# Patient Record
Sex: FEMALE | Race: AMERICAN INDIAN OR ALASKA NATIVE
[De-identification: names, ages, dates, MRNs, and addresses within clinical notes are randomized per-mention and may not be internally consistent; named-entity substitution may affect disease eponyms.]

---

## 2017-09-18 NOTE — C.PDOC
History Of Present Illness





27 y/o female with hx asthma c/o cough with chest tightness for last 2 1/2 days 

with chills. pt ran out of inhaler. no sick contacts. pt has pleuritic pain 

with cough, no sob. no fever. pt not breastfeeding.  


Time Seen by Provider: 17 14:12


Chief Complaint (Nursing): Cough, Cold, Congestion


History Per: Patient


History/Exam Limitations: no limitations


Onset/Duration Of Symptoms: Days (2 1/2)


Current Symptoms Are (Timing): Still Present


Associated Symptoms: Chills, Cough


Ear Symptoms: Bilateral: None





Past Medical History


Reviewed: Historical Data, Nursing Documentation, Vital Signs


Vital Signs: 


 Last Vital Signs











Temp  97.4 F L  17 16:42


 


Pulse  84   17 16:42


 


Resp  18   17 16:42


 


BP  117/78   17 16:42


 


Pulse Ox  96   17 22:55














- Medical History


PMH: Asthma


Surgical History: 


Family History: States: Unknown Family Hx





- Social History


Hx Tobacco Use: No


Hx Alcohol Use: No


Hx Substance Use: No





- Immunization History


Hx Tetanus Toxoid Vaccination: No


Hx Influenza Vaccination: No


Hx Pneumococcal Vaccination: No





Review Of Systems


Constitutional: Positive for: Chills.  Negative for: Fever


ENT: Negative for: Ear Pain, Throat Pain


Cardiovascular: Positive for: Chest Pain (sasthma like tightness)


Respiratory: Positive for: Cough, Pleuritic Pain, Sputum


Gastrointestinal: Negative for: Vomiting, Abdominal Pain, Diarrhea


Musculoskeletal: Negative for: Back Pain


Neurological: Negative for: Weakness, Numbness





Physical Exam





- Physical Exam


Appears: Non-toxic, Other (uncomfortable)


Skin: Warm, Dry


Head: Atraumatic, Normacephalic


Oral Mucosa: Moist


Tongue: Normal Appearing


Lips: Normal Appearing


Throat: No Erythema, No Exudate


Neck: Normal ROM, Supple


Chest: No Deformity, No Tenderness


Cardiovascular: Rhythm Regular (tachycardic, low 100s)


Respiratory: Decreased Breath Sounds (with insp wheezing in lower fields. ), No 

Rales, No Rhonchi, Wheezing, Other (tachypneic)


Back: No CVA Tenderness


Neurological/Psych: Oriented x3, Normal Speech, Normal Cognition





ED Course And Treatment


ECG: Interpreted By Me, Viewed By Me


ECG Rhythm: Sinus Tachycardia


Interpretation Of ECG: nsr, non specific st-t changes


Rate From EC


O2 Sat by Pulse Oximetry: 96


Pulse Ox Interpretation: Normal





Medical Decision Making


Medical Decision Making: 





pt with cough and chest tightness; ekg, cxr, duoneb and tylenol ordered. will re

-eval. 





327 pm  pt reports dec chest tightness after tylenol and nebulizer.  increased 

bs in upper fields, still coarse sounds in basilar fields. will give another 

neb tx. 





444 pm  pt feeling much better, lungs cta, not tachypneic. will d/c with 

albuterol mdi. tylenol and zithromax. f/u med clinic in 1-2 days. 





Disposition


Counseled Patient/Family Regarding: Studies Performed, Diagnosis, Need For 

Followup, Rx Given





- Disposition


Referrals: 


Clinic,Med Surg [Primary Care Provider] - 


Disposition: HOME/ ROUTINE


Disposition Time: 16:45


Condition: IMPROVED


Additional Instructions: 


Take antibiotic once a day for next 4 days. Use inhaler every 4-6 hours, 2 

puffs at a time. Tylenol for pain.  Follow up wiht PMD in 1-2 days.  Return to 

ED for any worsening symptoms. 


Prescriptions: 


Albuterol HFA [Ventolin HFA 90 mcg/actuation (8 g)] 2 puff IH [id number] #1 inh


Acetaminophen [Tylenol Extra Strength] 1,000 mg PO TID #40 tablet


Azithromycin [Zithromax] 250 mg PO DAILY #4 tab


Instructions:  Community Acquired Pneumonia (ED)


Forms:  [facility] (English), General Discharge Instructions





- Clinical Impression


Clinical Impression: 


 Pneumonia, Asthma

## 2017-09-18 NOTE — RAD
Chest x-ray two views 



History: Cough. 



Comparison: None available. 



Findings: 



Diffuse increased interstitial lung markings which may represent mild 

venous congestion and or mild interstitial infiltrate. 



No focal pleural effusion. 



Minimal atelectatic changes in the right midlung zone. 



Bibasilar breast and nipple shadows. 



Heart size within normal limits. 



Impression: 



Diffuse increased interstitial lung markings which may represent mild 

venous congestion and or mild interstitial infiltrate. 



No focal pleural effusion. 



Minimal atelectatic changes in the right midlung zone.

## 2017-09-19 NOTE — CARD
--------------- APPROVED REPORT --------------





EKG Measurement

Heart [id number]

WV [id number]

[id number]

[id number]

[id number]



<Conclusion>

Sinus tachycardia

Nonspecific T wave abnormality

Abnormal ECG

## 2019-03-02 ENCOUNTER — HOSPITAL ENCOUNTER (EMERGENCY)
Dept: HOSPITAL 31 - C.ER | Age: 29
Discharge: HOME | End: 2019-03-02
Payer: SELF-PAY

## 2019-03-02 VITALS — OXYGEN SATURATION: 98 %

## 2019-03-02 VITALS
TEMPERATURE: 98.3 F | SYSTOLIC BLOOD PRESSURE: 125 MMHG | HEART RATE: 71 BPM | DIASTOLIC BLOOD PRESSURE: 79 MMHG | RESPIRATION RATE: 16 BRPM

## 2019-03-02 DIAGNOSIS — N76.5: Primary | ICD-10-CM

## 2019-03-02 PROCEDURE — 87181 SC STD AGAR DILUTION PER AGT: CPT

## 2019-03-02 PROCEDURE — 81025 URINE PREGNANCY TEST: CPT

## 2019-03-02 PROCEDURE — 87070 CULTURE OTHR SPECIMN AEROBIC: CPT

## 2019-03-02 PROCEDURE — 96372 THER/PROPH/DIAG INJ SC/IM: CPT

## 2019-03-02 PROCEDURE — 86592 SYPHILIS TEST NON-TREP QUAL: CPT

## 2019-03-02 PROCEDURE — 87491 CHLMYD TRACH DNA AMP PROBE: CPT

## 2019-03-02 PROCEDURE — 99284 EMERGENCY DEPT VISIT MOD MDM: CPT

## 2019-03-02 PROCEDURE — 87591 N.GONORRHOEAE DNA AMP PROB: CPT

## 2019-03-02 NOTE — C.PDOC
History Of Present Illness


29 y/o female pt presents to the ER c/o painful lesion in her vaginal area for x

4 days. Pt reports she found the lesion a couple of days ago but it was not 

painful as it is now. Patient denies vaginal discharge. Pt denies fever, chills,

dysuria, abdominal pain, nausea and vomiting. 


Time Seen by Provider: 19 19:53


Chief Complaint (Nursing): Female Genitourinary


History Per: Patient


History/Exam Limitations: no limitations


Onset/Duration Of Symptoms: Days (x4)


Current Symptoms Are (Timing): Still Present





Past Medical History


Reviewed: Historical Data, Nursing Documentation, Vital Signs


Vital Signs: 





                                Last Vital Signs











Temp  98.2 F   19 19:42


 


Pulse  70   19 19:42


 


Resp  20   19 19:42


 


BP  133/83   19 19:42


 


Pulse Ox  98   19 19:42














- Medical History


PMH: Asthma


Surgical History: 


Family History: States: Unknown Family Hx





- Social History


Hx Tobacco Use: No


Hx Alcohol Use: No


Hx Substance Use: No





- Immunization History


Hx Tetanus Toxoid Vaccination: No


Hx Influenza Vaccination: No


Hx Pneumococcal Vaccination: No





Review Of Systems


Except As Marked, All Systems Reviewed And Found Negative.


Constitutional: Negative for: Fever, Chills


Gastrointestinal: Negative for: Nausea, Vomiting, Abdominal Pain


Genitourinary: Positive for: Vaginal Discharge, Other (clump in vaginal area; 

lesions )





Physical Exam





- Physical Exam


Appears: Non-toxic, No Acute Distress


Skin: Warm, Dry, No Rash


Head: Normacephalic


Eye(s): bilateral: Normal Inspection


Gastrointestinal/Abdominal: Soft, No Tenderness


Pelvic: Vaginal Bleeding (due to 2nd day of menstruation), No Vaginal Discharge,

Other (single greyish ulceration, based in the entrance of the vagina; ulcer 

tender to palpation; no lymphadenopathy )


Extremity: Normal ROM, No Tenderness


Neurological/Psych: Oriented x3, Normal Speech, Normal Cognition





ED Course And Treatment


O2 Sat by Pulse Oximetry: 98 (RA)


Progress Note: Ulcer was swabbed for culture and GC chlamydia was ordered.  

Blood was taken to test for syphilis.  Pt treated with rocephin and zithromax 

and instructed to f/u with OBGYN





Disposition





- Disposition


Referrals: 


[facility] [Outside]


[facility] [Outside]


Disposition: HOME/ ROUTINE


Disposition Time: 20:42


Condition: STABLE


Additional Instructions: 


Follow up with OBGYN within 1-2 days. Return to ED if feel worse.


Instructions:  Sexually-Transmitted Diseases (DC)


Forms:  [facility] (English)





- Clinical Impression


Clinical Impression: 


 Genital ulcer, female








- PA / NP / Resident Statement


MD/DO has reviewed & agrees with the documentation as recorded.





- Scribe Statement


The provider has reviewed the documentation as recorded by the Ketty Rucker Do





All medical record entries made by the Usmanibfartun were at my direction and 

personally dictated by me. I have reviewed the chart and agree that the record 

accurately reflects my personal performance of the history, physical exam, 

medical decision making, and the department course for this patient. I have also

 personally directed, reviewed, and agree with the discharge instructions and 

disposition.

## 2019-03-12 ENCOUNTER — HOSPITAL ENCOUNTER (EMERGENCY)
Dept: HOSPITAL 31 - C.ER | Age: 29
End: 2019-03-12
Payer: SELF-PAY

## 2019-04-29 ENCOUNTER — HOSPITAL ENCOUNTER (EMERGENCY)
Dept: HOSPITAL 31 - C.ER | Age: 29
Discharge: HOME | End: 2019-04-29
Payer: MEDICAID

## 2019-04-29 VITALS
OXYGEN SATURATION: 99 % | TEMPERATURE: 97.7 F | RESPIRATION RATE: 20 BRPM | SYSTOLIC BLOOD PRESSURE: 144 MMHG | DIASTOLIC BLOOD PRESSURE: 95 MMHG | HEART RATE: 73 BPM

## 2019-04-29 DIAGNOSIS — S46.912A: Primary | ICD-10-CM
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
## 2019-04-29 NOTE — C.PDOC
History Of Present Illness


29 y/o female presents to the ER complaining of left trapezius pain which has 

been present for the past 3 days. Patient states that the pain is worse with hot

showers.Patient reports that she did not use any NSAID's. Denies having trauma, 

falls, injuries, weakness and numbness.


Time Seen by Provider: 19 11:12


Chief Complaint (Nursing): Back Pain


History Per: Patient


History/Exam Limitations: no limitations


Onset/Duration Of Symptoms: Days


Current Symptoms Are (Timing): Still Present


Severity: Moderate





Past Medical History


Reviewed: Historical Data, Nursing Documentation, Vital Signs


Vital Signs: 





                                Last Vital Signs











Temp  97.7 F   19 10:28


 


Pulse  73   19 10:28


 


Resp  20   19 10:28


 


BP  144/95 H  19 10:28


 


Pulse Ox  99   19 10:28














- Medical History


PMH: Asthma


Surgical History: 


Family History: States: Unknown Family Hx





- Social History


Hx Tobacco Use: No


Hx Alcohol Use: Yes


Hx Substance Use: No





- Immunization History


Hx Tetanus Toxoid Vaccination: No


Hx Influenza Vaccination: No


Hx Pneumococcal Vaccination: No





Review Of Systems


Except As Marked, All Systems Reviewed And Found Negative.


Musculoskeletal: Positive for: Other (left sided trapezius pain)


Neurological: Negative for: Weakness, Numbness





Physical Exam





- Physical Exam


Appears: Non-toxic, No Acute Distress


Skin: Warm, Dry


Head: Atraumatic, Normacephalic


Eye(s): bilateral: Normal Inspection


Nose: Normal


Oral Mucosa: Moist


Neck: Supple, Other (medial trapezius tenderness with inflammation)


Chest: Symmetrical


Cardiovascular: Rhythm Regular


Respiratory: Normal Breath Sounds, No Rales, No Rhonchi, No Wheezing


Extremity: Normal ROM


Neurological/Psych: Oriented x3, Normal Speech





ED Course And Treatment


O2 Sat by Pulse Oximetry: 99 (RA)


Pulse Ox Interpretation: Normal





Medical Decision Making


Medical Decision Making: 





L trapezius strain/sprain, no trauma


ice/NSAIDS





Disposition


Doctor Will See Patient In The: Office


Counseled Patient/Family Regarding: Studies Performed, Diagnosis





- Disposition


Referrals: 


 Service [Outside]


[facility] [Outside]


[facility] [Outside]


[facility] [Outside]


Disposition: HOME/ ROUTINE


Disposition Time: 11:15


Condition: GOOD


Additional Instructions: 


ice packs 1/2 hour per hour, nothing hot


no hot showers for 3 days





Advil/Motrin 400-600 mg every 6 hours as needed








Instructions:  Muscle Strain


Forms:  [facility] (English)





- Clinical Impression


Clinical Impression: 


 Trapezius muscle strain








- Scribe Statement


The provider has reviewed the documentation as recorded by the Scribe


Fredis Conroy


Provider Attestation: 





All medical record entries made by the Scribe were at my direction and 

personally dictated by me. I have reviewed the chart and agree that the record 

accurately reflects my personal performance of the history, physical exam, 

medical decision making, and the department course for this patient. I have also

personally directed, reviewed, and agree with the discharge instructions and 

disposition.